# Patient Record
Sex: FEMALE | Race: NATIVE HAWAIIAN OR OTHER PACIFIC ISLANDER
[De-identification: names, ages, dates, MRNs, and addresses within clinical notes are randomized per-mention and may not be internally consistent; named-entity substitution may affect disease eponyms.]

---

## 2017-08-01 ENCOUNTER — HOSPITAL ENCOUNTER (OUTPATIENT)
Dept: HOSPITAL 67 - RAD | Age: 32
Discharge: HOME | End: 2017-08-01
Attending: FAMILY MEDICINE
Payer: COMMERCIAL

## 2017-08-01 DIAGNOSIS — M54.6: Primary | ICD-10-CM

## 2017-10-20 ENCOUNTER — HOSPITAL ENCOUNTER (EMERGENCY)
Dept: HOSPITAL 24 - ER | Age: 32
Discharge: HOME | End: 2017-10-20
Payer: COMMERCIAL

## 2017-10-20 VITALS — BODY MASS INDEX: 29.2 KG/M2

## 2017-10-20 VITALS — SYSTOLIC BLOOD PRESSURE: 115 MMHG | DIASTOLIC BLOOD PRESSURE: 61 MMHG

## 2017-10-20 DIAGNOSIS — T78.3XXA: Primary | ICD-10-CM

## 2017-10-20 DIAGNOSIS — J01.80: ICD-10-CM

## 2017-10-20 LAB
ALBUMIN SERPL BCP-MCNC: 3.6 G/DL (ref 3.4–5)
ALP SERPL-CCNC: 56 UNITS/L (ref 46–116)
ALT SERPL W P-5'-P-CCNC: 24 UNITS/L (ref 12–78)
AST SERPL W P-5'-P-CCNC: 22 UNITS/L (ref 15–37)
BASOPHILS # BLD AUTO: 0.1 X10^3/UL (ref 0–0.1)
BASOPHILS NFR BLD AUTO: 1 % (ref 0.2–1)
BUN SERPL-MCNC: 10 MG/DL (ref 7–18)
CALCIUM ALBUM COR SERPL-SCNC: (no result) MG/DL (ref 8.5–10.1)
CALCIUM ALBUM COR SERPL-SCNC: 140 MMOL/L (ref 136–145)
CALCIUM SERPL-MCNC: 9 MG/DL (ref 8.5–10.1)
CHLORIDE SERPL-SCNC: 105 MMOL/L (ref 98–107)
CO2 SERPL-SCNC: 30 MMOL/L (ref 21–32)
CREAT SERPL-MCNC: 0.87 MG/DL (ref 0.55–1.02)
EGFR  BLACK RACES: > 60 (ref 60–?)
EOSINOPHIL # BLD AUTO: 0.9 X10^3/UL (ref 0–0.2)
EOSINOPHIL NFR BLD AUTO: 8.2 % (ref 0.9–2.9)
ERYTHROCYTE [DISTWIDTH] IN BLOOD BY AUTOMATED COUNT: 16.2 % (ref 11.6–16.5)
HCT VFR BLD AUTO: 38 % (ref 36–47)
HGB BLD-MCNC: 12.8 G/DL (ref 12–16)
LYMPHOCYTES # BLD AUTO: 2.1 X10^3/UL (ref 1.3–2.9)
LYMPHOCYTES NFR BLD AUTO: 19.7 % (ref 21–51)
MCH RBC QN AUTO: 27.4 PG (ref 27–34)
MCHC RBC AUTO-ENTMCNC: 33.5 G/DL (ref 33–35)
MCV RBC AUTO: 81.6 FL (ref 80–100)
MONOCYTES # BLD AUTO: 0.6 X10^3/UL (ref 0.3–0.8)
MONOCYTES NFR BLD AUTO: 5.8 % (ref 0–13)
NEUTROPHILS # BLD AUTO: 6.8 X10^3/UL (ref 2.2–4.8)
NEUTROPHILS NFR BLD AUTO: 65.3 % (ref 42–75)
PLATELET # BLD: 191 X10^3/UL (ref 150–450)
PMV BLD AUTO: 8.6 FL (ref 7.4–11)
PROT SERPL-MCNC: 7.2 G/DL (ref 6.4–8.2)
RBC # BLD AUTO: 4.66 X10^6/UL (ref 3.5–5.4)
SODIUM SERPL-SCNC: 139 MMOL/L (ref 136–145)
WBC NRBC COR # BLD AUTO: 10.4 X10^3/UL (ref 3.6–10)

## 2017-10-20 PROCEDURE — 36415 COLL VENOUS BLD VENIPUNCTURE: CPT

## 2017-10-20 PROCEDURE — 99283 EMERGENCY DEPT VISIT LOW MDM: CPT

## 2017-10-20 PROCEDURE — 80053 COMPREHEN METABOLIC PANEL: CPT

## 2017-10-20 PROCEDURE — 85025 COMPLETE CBC W/AUTO DIFF WBC: CPT

## 2017-10-20 PROCEDURE — 70486 CT MAXILLOFACIAL W/O DYE: CPT

## 2017-10-20 PROCEDURE — 84443 ASSAY THYROID STIM HORMONE: CPT

## 2017-10-20 NOTE — DR.EYE
HPI





- Time Seen


Time seen: 09:00





- PCP


Primary Care Physician: LIBRADO CAMPBELL





- HPI Comment


HPI Comment: SOME KNOT NOTED IN AREA 3 DAYS AGO WHICH RESOLVE. WORSE TODAY.





- Nurses notes reviewed


Nurses Notes Review: Yes





- Complaint


Chief Complaint Doctors Comments: PAIN AND SWELLING LEFT EYE TIMES ONE DAY.


Chief Complaint:: LEFT EYE SWOLLEN AND PAINFUL. SWELLING BEGAN YESTERDAY. PT. 

STATES 2-3 DAYS AGO SHE HAD A KNOT ON HER FOREHEAD IN BETWEEN HER EYES.





- Source


History Provided: Patient





- Mode of arrival


Mode of Arrival: Ambulatory





- Timing


Onset of Chief Complaint: 10/19/17


Came on: Suddenly





- Quality


Quality: Pain





- Location


Location: Left eye





- Context


Onset: Spontaneous


Recent: None


History of: None


Last Tetanus: Unknown





- Severity


Symptom severity: None





- Associated signs and symptoms


Associated signs and symptoms: None





PMH





- PMH


Past Medical History: Yes


Past Medical History: Anxiety, Depression


Past Surgical History: Yes


Surgical History: GYN Surgery, Hysterectomy, Ortho Surgery





- Family History


History of Family Medical Conditions: Yes


Family Medical History: Diabetes Mellitus, Hypertension





- Social History


Does patient currently use any type of tobacco product: Yes


Have you used tobacco products in the last 12 months: Yes


Type of Tobacco Use: Cigarettes


How many years tobacco product used: 12


Does any household member use tobacco: No


Alcohol Use: None


Do you use any recreational Drugs:: No


Lives With: Spouse


Lives Where: Home





- infectious screening


In the last 2 months have you had wt loss of >10#?: NO


Have you had fever, night sweats or hemotysis?: No


Have you traveled outside the country in the last 6 months?: No


Isolation: Standard





ROS





- Review of Systems


Constitutional: No Symptoms Reported


Eyes: Blurred Vision


ENTM: No Symptoms Reported.  negative: Ear Pain, Nose Discharge, Nose Congestion

, Throat Pain


Respiratoy: No Symptoms Reported


Cardiovascular: No Symptoms Reported


Gastrointestinal/Abdominal: No Symptoms Reported


Genitourinary: No Symptoms Reported


Neurological: No Symptoms Reported


Musculoskeletal: No Symptoms Reported


Integumentary: No Symptoms Reported


Hematologic/Lymphatic: No Symptoms Reported


Endocrine: No Symptoms Reported


All Other Systems: Reviewed and Negative





PE





- Vital Signs


Vitals: 


 





Temperature                      98.4 F


Pulse Rate                       85


Respiratory Rate                 17


Blood Pressure [Right Arm]       98/57


Blood Pressure                   115/61


O2 Sat by Pulse Oximetry         99











- General


Limitations: No Limitations


General Appearance: Alert





- Head


Head Exam: Normal Inspection





- Eyes


Eye exam: PERRL, EOMI, Conjunctival Injection (LT EYE)


Eyelids: Swelling Eyelid: Left


Pupils: Regular, Round: Bilateral, Reactive: Bilateral


Sclera/Conjunctival: Injection: Left





- ENT


ENT Exam: Normal  External Ear Exam


External Ear Exam: Normal External Inspection


TM/Canal Exam: Bilateral Normal


Nose Exam: Normal Nose Exam


Mouth Exam: Normal Inspection


Throat Exam: Normal Inspection





- Neck


Neck Exam: Trachea Midline





- Chest


Chest Inspection: Symmetric Chest Wall Rise





- Respiratory


Respiratory Exam: Normal Lung Sounds Bilat


Respiratory Exam: Bilateral Clear to Auscultation





- Cardiovascular


Cardiovascular Exam: Regular Rate, Normal Rhythm, Normal Heart Sounds





- Abdominal Exam


Abdominal Exam: Normal Bowel Sounds, Soft.  negative: Tenderness





- Extremities


Extremities Exam: Normal Inspection





- Back


Back Exam: Normal Inspection





- Neurologic


Neurological Exam: Alert, Oriented X3





- Skin


Skin Exam: Normal Color





MDM





- Additional Information


Additional Information Obtained From: Family





- Differential Diagnosis


Differential Diagnosis: Other (LT EYE CONJUNCTIVITIS, DROOPING AND SWELLING)





Course





- Treatment


Treatment: SEE ORDERS.





- Education/Counseling


Education/Counseling: Patient, Education


Educated On: Diagnosis, Needs for Follow Up





ROR





- Labs Reviewed


Laboratory Results Reviewed?: Yes


Result Diagrams: 


 10/20/17 09:12





 10/20/17 09:12


Laboratory: 


 











WBC  10.4 X10^3/uL (3.6-10.0)  H  10/20/17  09:12    


 


RBC  4.66 X10^6/uL (3.5-5.4)   10/20/17  09:12    


 


Hgb  12.8 g/dL (12.0-16.0)   10/20/17  09:12    


 


Hct  38.0 % (36.0-47.0)   10/20/17  09:12    


 


MCV  81.6 fL (80.0-100.0)   10/20/17  09:12    


 


MCH  27.4 pg (27.0-34.0)   10/20/17  09:12    


 


MCHC  33.5 g/dL (33.0-35.0)   10/20/17  09:12    


 


RDW  16.2 % (11.6-16.5)   10/20/17  09:12    


 


Plt Count  191 X10^3/uL (150.0-450.0)   10/20/17  09:12    


 


MPV  8.6 fL (7.4-11.0)   10/20/17  09:12    


 


Neut %  65.3 % (42.0-75.0)   10/20/17  09:12    


 


Lymph %  19.7 % (21.0-51.0)  L  10/20/17  09:12    


 


Mono %  5.8 % (0.0-13.0)   10/20/17  09:12    


 


Eos %  8.2 % (0.9-2.9)  H  10/20/17  09:12    


 


Baso %  1.0 % (0.2-1.0)   10/20/17  09:12    


 


Neut #  6.8 x10^3/uL (2.2-4.8)  H  10/20/17  09:12    


 


Lymph #  2.1 X10^3/uL (1.3-2.9)   10/20/17  09:12    


 


Mono #  0.6 x10^3/uL (0.3-0.8)   10/20/17  09:12    


 


Eos #  0.9 x10^3/uL (0.0-0.2)  H  10/20/17  09:12    


 


Baso #  0.1 X10^3/uL (0.0-0.1)   10/20/17  09:12    


 


Absolute Nucleated RBC  0.0 /100WBC  10/20/17  09:12    


 


Sodium  139 mmol/L (136-145)   10/20/17  09:12    


 


Corrected Sodium  140 mmol/L (136-145)   10/20/17  09:12    


 


Potassium  3.7 mmol/L (3.5-5.1)   10/20/17  09:12    


 


Chloride  105 mmol/L ()   10/20/17  09:12    


 


Carbon Dioxide  30.0 mmol/L (21-32)   10/20/17  09:12    


 


BUN  10 mg/dL (7-18)   10/20/17  09:12    


 


Creatinine  0.87 mg/dL (0.55-1.02)   10/20/17  09:12    


 


Est GFR (MDRD) Af Amer  > 60  (>60)   10/20/17  09:12    


 


Est GFR (MDRD) Non-Af  > 60  (>60)   10/20/17  09:12    


 


Glucose  129 mg/dL (65-99)  H  10/20/17  09:12    


 


Calcium  9.0 mg/dL (8.5-10.1)   10/20/17  09:12    


 


Corrected Calcium  TNP   10/20/17  09:12    


 


Total Bilirubin  0.20 mg/dL (0.2-1.0)   10/20/17  09:12    


 


AST  22 Units/L (15-37)   10/20/17  09:12    


 


ALT  24 Units/L (12-78)   10/20/17  09:12    


 


Alkaline Phosphatase  56 Units/L ()   10/20/17  09:12    


 


Total Protein  7.2 g/dL (6.4-8.2)   10/20/17  09:12    


 


Albumin  3.6 g/dL (3.4-5.0)   10/20/17  09:12    


 


Globulin  3.6 g/dL (2.5-4.5)   10/20/17  09:12    


 


Albumin/Globulin Ratio  1.0 Ratio (1.1-2.1)  L  10/20/17  09:12    


 


TSH 3rd Generation  0.904 uIU/mL (0.358-3.74)   10/20/17  09:12    














- XRAY


XRAY Interpreted by: Radiologist


XRAY Findings: REPORT DISCUSS WITH PATIENT.





- Diagnosis


Discharge Problem: 


Angioedema


Qualifiers:


 Encounter type: initial encounter Qualified Code(s): T78.3XXA - Angioneurotic 

edema, initial encounter





Sinusitis


Qualifiers:


 Sinusitis location: other Chronicity: acute Recurrence: not specified as 

recurrent Qualified Code(s): J01.80 - Other acute sinusitis








- Discharge Plan


Disposition: 01 HOME, SELF-CARE


Condition: Stable


Prescriptions: 


Amoxicillin & Pot Clavulanate [AUGMENTIN  mg/125 mg *] 1 tab PO BID #20 

tab


Hydroxyzine Pamoate [Vistaril] 25 mg PO TID PRN #15 cap


 PRN Reason: 


Methylprednisolone Dosepak 4Mg [MEDROL DOSEPAK (4 mg tab x 21)] 1 kaila PO ONCE #

1 kaila





- Follow ups/Referrals


Follow ups/Referrals: 


STEPH CAMPBELL [Primary Care Provider] - 3 days





- Instructions


Instructions:  Angioedema, Easy-to-Read, Sinusitis, Adult, Easy-to-Read


Additional Instructions: 


RETURN TO ED IF WORSE.

## 2017-10-20 NOTE — CT
History: Swollen left eye and redness



Study: CT face and facial bones without contrast. Sagittal and coronal reformations were provided.



Comparison: None



Findings: There is mild mucosal thickening in the ethmoid sinuses. There is mucosal thickening in the
 floors of the maxillary sinuses without fluid level .There is mild mucosal thickening in the sphenoi
d sinuses. There is no fracture or bone erosion. The orbits are intact. There is soft tissue swelling
 over the left orbit. There is no intraconal mass or fluid collection. There are prominent cervical l
ymph nodes but less than 1 cm.



Impression:



1. Nonspecific soft tissue swelling over the left orbit.



2. Mild paranasal sinusitis



Reported By:Electronically Signed by ALICIA JESUS MD at 10/20/2017 10:22:03 AM

## 2018-04-10 ENCOUNTER — HOSPITAL ENCOUNTER (OUTPATIENT)
Dept: HOSPITAL 67 - OR | Age: 33
Discharge: HOME | End: 2018-04-10
Attending: SURGERY
Payer: COMMERCIAL

## 2018-04-10 VITALS — HEIGHT: 55 IN | WEIGHT: 1 LBS | BODY MASS INDEX: 0.23 KG/M2

## 2018-04-10 DIAGNOSIS — R13.19: Primary | ICD-10-CM

## 2018-04-10 LAB
PLATELET # BLD: 249 K/UL (ref 152–353)
POTASSIUM SERPL-SCNC: 4 MMOL/L (ref 3.6–5.2)
SODIUM SERPL-SCNC: 139 MMOL/L (ref 136–145)

## 2018-04-10 PROCEDURE — 0DJ08ZZ INSPECTION OF UPPER INTESTINAL TRACT, VIA NATURAL OR ARTIFICIAL OPENING ENDOSCOPIC: ICD-10-PCS | Performed by: SURGERY

## 2018-04-10 PROCEDURE — 80053 COMPREHEN METABOLIC PANEL: CPT

## 2018-04-10 PROCEDURE — 85027 COMPLETE CBC AUTOMATED: CPT

## 2018-04-11 ENCOUNTER — HOSPITAL ENCOUNTER (OUTPATIENT)
Dept: HOSPITAL 67 - RAD | Age: 33
Discharge: HOME | End: 2018-04-11
Attending: NURSE PRACTITIONER
Payer: COMMERCIAL

## 2018-04-11 DIAGNOSIS — M54.12: Primary | ICD-10-CM

## 2018-04-11 DIAGNOSIS — M54.6: ICD-10-CM

## 2018-08-08 ENCOUNTER — HOSPITAL ENCOUNTER (OUTPATIENT)
Dept: HOSPITAL 67 - RESP | Age: 33
Discharge: HOME | End: 2018-08-08
Attending: FAMILY MEDICINE
Payer: COMMERCIAL

## 2018-08-08 DIAGNOSIS — R20.2: Primary | ICD-10-CM

## 2018-08-08 PROCEDURE — 95911 NRV CNDJ TEST 9-10 STUDIES: CPT

## 2018-08-08 PROCEDURE — 95885 MUSC TST DONE W/NERV TST LIM: CPT
